# Patient Record
Sex: FEMALE | Race: WHITE | NOT HISPANIC OR LATINO | Employment: FULL TIME | URBAN - METROPOLITAN AREA
[De-identification: names, ages, dates, MRNs, and addresses within clinical notes are randomized per-mention and may not be internally consistent; named-entity substitution may affect disease eponyms.]

---

## 2017-06-05 ENCOUNTER — HOSPITAL ENCOUNTER (EMERGENCY)
Facility: HOSPITAL | Age: 37
Discharge: LEFT BEFORE DISCHARGE | End: 2017-06-05
Payer: MEDICAID

## 2017-06-05 VITALS
TEMPERATURE: 99 F | OXYGEN SATURATION: 100 % | DIASTOLIC BLOOD PRESSURE: 100 MMHG | HEIGHT: 67 IN | HEART RATE: 68 BPM | SYSTOLIC BLOOD PRESSURE: 195 MMHG | RESPIRATION RATE: 18 BRPM | BODY MASS INDEX: 33.59 KG/M2 | WEIGHT: 214 LBS

## 2017-06-05 DIAGNOSIS — I10 HYPERTENSION: ICD-10-CM

## 2017-06-05 LAB
B-HCG UR QL: NEGATIVE
CTP QC/QA: YES

## 2017-06-05 PROCEDURE — 99900041 HC LEFT WITHOUT BEING SEEN- EMERGENCY

## 2017-06-05 PROCEDURE — 81025 URINE PREGNANCY TEST: CPT | Performed by: EMERGENCY MEDICINE

## 2017-06-13 ENCOUNTER — OFFICE VISIT (OUTPATIENT)
Dept: FAMILY MEDICINE | Facility: CLINIC | Age: 37
End: 2017-06-13
Payer: MEDICAID

## 2017-06-13 ENCOUNTER — TELEPHONE (OUTPATIENT)
Dept: FAMILY MEDICINE | Facility: CLINIC | Age: 37
End: 2017-06-13

## 2017-06-13 DIAGNOSIS — I10 HTN, GOAL BELOW 140/90: Primary | ICD-10-CM

## 2017-06-13 DIAGNOSIS — E66.9 OBESITY, UNSPECIFIED OBESITY SEVERITY, UNSPECIFIED OBESITY TYPE: ICD-10-CM

## 2017-06-13 DIAGNOSIS — F41.9 ANXIETY: ICD-10-CM

## 2017-06-13 PROCEDURE — 99214 OFFICE O/P EST MOD 30 MIN: CPT | Mod: S$PBB,,, | Performed by: FAMILY MEDICINE

## 2017-06-13 PROCEDURE — 99213 OFFICE O/P EST LOW 20 MIN: CPT | Mod: PBBFAC,PO | Performed by: FAMILY MEDICINE

## 2017-06-13 PROCEDURE — 99999 PR PBB SHADOW E&M-EST. PATIENT-LVL III: CPT | Mod: PBBFAC,,, | Performed by: FAMILY MEDICINE

## 2017-06-13 RX ORDER — HYDROCHLOROTHIAZIDE 12.5 MG/1
12.5 TABLET ORAL DAILY
Qty: 30 TABLET | Refills: 0 | Status: SHIPPED | OUTPATIENT
Start: 2017-06-13 | End: 2018-05-08

## 2017-06-13 RX ORDER — BUPROPION HYDROCHLORIDE 150 MG/1
TABLET ORAL
Refills: 2 | COMMUNITY
Start: 2017-05-17 | End: 2017-06-13 | Stop reason: SDUPTHER

## 2017-06-13 RX ORDER — BUPROPION HYDROCHLORIDE 150 MG/1
TABLET ORAL
Qty: 90 TABLET | Refills: 3 | Status: SHIPPED | OUTPATIENT
Start: 2017-06-13 | End: 2018-05-08

## 2017-06-13 NOTE — PROGRESS NOTES
"Chief Complaint   Patient presents with    Follow Up/ Blood Pressure    Headache       SUBJECTIVE:  Irene Chau is a 37 y.o. female here for new problem of HTN and headache and hasn't been taking the medication. No CP or SoB and she has more anxious lately.  Currently has co-morbidities including per problem list.      History reviewed. No pertinent past medical history.  Past Surgical History:   Procedure Laterality Date    TONSILLECTOMY      TUBAL LIGATION       Social History     Social History    Marital status: Single     Spouse name: N/A    Number of children: 3    Years of education: N/A     Occupational History    Not on file.     Social History Main Topics    Smoking status: Current Every Day Smoker    Smokeless tobacco: Never Used    Alcohol use Yes      Comment: drinks at work, some binge drinking    Drug use: No    Sexual activity: Yes     Partners: Male     Other Topics Concern    Not on file     Social History Narrative    Works at [employer], she does drink and smoke because of that.        Would love to travel to new places.      Family History   Problem Relation Age of Onset    Heart disease Father     Asthma Son     No Known Problems Son     Breast cancer Mother     Colon cancer Neg Hx     Ovarian cancer Neg Hx      No current outpatient prescriptions on file prior to visit.     No current facility-administered medications on file prior to visit.      Review of patient's allergies indicates:  No Known Allergies      Review of Systems   Cardiovascular: Positive for chest pain and leg swelling. Negative for palpitations, orthopnea, claudication and PND.   Neurological: Positive for headaches.   Psychiatric/Behavioral: Negative for depression.       OBJECTIVE:  /82   Pulse 70   Temp 97.8 °F (36.6 °C) (Oral)   Ht 5' 7" (1.702 m)   Wt 95.6 kg (210 lb 12.2 oz)   LMP 06/05/2017   SpO2 99%   BMI 33.01 kg/m²     Wt Readings from Last 3 Encounters:   06/13/17 95.6 kg (210 lb " 12.2 oz)   06/05/17 97.1 kg (214 lb)   11/11/16 94.8 kg (209 lb)     BP Readings from Last 3 Encounters:   06/13/17 136/82   06/05/17 (!) 195/100   11/11/16 (!) 142/83       She appears well, in no apparent distress.  Alert and oriented times three, pleasant and cooperative. Vital signs are as documented in vital signs section.  S1 and S2 normal, no murmurs, clicks, gallops or rubs. Regular rate and rhythm. Chest is clear; no wheezes or rales. No edema or JVD.      Review of old Records:  Reviewed per [facility]    Review of old labs:  Lab Results   Component Value Date    TSH 1.858 03/17/2015     Lab Results   Component Value Date    WBC 6.81 03/17/2015    HGB 13.5 03/17/2015    HCT 40.6 03/17/2015    MCV 88 03/17/2015     (L) 03/17/2015       Chemistry        Component Value Date/Time     03/17/2015 0955    K 4.4 03/17/2015 0955     03/17/2015 0955    CO2 25 03/17/2015 0955    BUN 14 03/17/2015 0955    CREATININE 0.9 03/17/2015 0955    GLU 97 03/17/2015 0955        Component Value Date/Time    CALCIUM 9.3 03/17/2015 0955    ALKPHOS 67 03/17/2015 0955    AST 18 03/17/2015 0955    ALT 21 03/17/2015 0955    BILITOT 0.7 03/17/2015 0955        Lab Results   Component Value Date    CHOL 205 (H) 03/17/2015     Lab Results   Component Value Date    HDL 47 03/17/2015     Lab Results   Component Value Date    LDLCALC 106.0 03/17/2015     Lab Results   Component Value Date    TRIG 260 (H) 03/17/2015     Lab Results   Component Value Date    CHOLHDL 22.9 03/17/2015         Review of old imaging:  n/a    ASSESSMENT:  Problem List Items Addressed This Visit     Obesity    HTN, goal below 140/90 - Primary    Relevant Medications    hydrochlorothiazide (HYDRODIURIL) 12.5 MG Tab    Anxiety    Relevant Medications    buPROPion (WELLBUTRIN XL) 150 MG TB24 tablet      Other Visit Diagnoses    None.         ICD-10-CM ICD-9-CM   1. HTN, goal below 140/90 I10 401.9   2. Obesity, unspecified obesity severity, unspecified  obesity type E66.9 278.00   3. Anxiety F41.9 300.00         PLAN:  1. HTN, goal below 140/90  Potential medication side effects were discussed with the patient; let me know if any occur.    - hydrochlorothiazide (HYDRODIURIL) 12.5 MG Tab; Take 1 tablet (12.5 mg total) by mouth once daily.  Dispense: 30 tablet; Refill: 0    2. Obesity, unspecified obesity severity, unspecified obesity type  The patient is asked to make an attempt to improve diet and exercise patterns to aid in medical management of this problem.      3. Anxiety  The current medical regimen is effective;  continue present plan and medications.    - buPROPion (WELLBUTRIN XL) 150 MG TB24 tablet; TAKE 3 TABLET(S) BY MOUTH EVERY 24 HOURS  Dispense: 90 tablet; Refill: 3      Medication List with Changes/Refills   New Medications    HYDROCHLOROTHIAZIDE (HYDRODIURIL) 12.5 MG TAB    Take 1 tablet (12.5 mg total) by mouth once daily.   Changed and/or Refilled Medications    Modified Medication Previous Medication    BUPROPION (WELLBUTRIN XL) 150 MG TB24 TABLET buPROPion (WELLBUTRIN XL) 150 MG TB24 tablet       TAKE 3 TABLET(S) BY MOUTH EVERY 24 HOURS    TAKE 1 TABLET(S) BY MOUTH EVERY 24 HOURS       Return in about 4 weeks (around 7/11/2017) for assess treatment plan, HTN managment.

## 2017-06-14 VITALS
HEIGHT: 67 IN | TEMPERATURE: 98 F | WEIGHT: 210.75 LBS | SYSTOLIC BLOOD PRESSURE: 136 MMHG | DIASTOLIC BLOOD PRESSURE: 82 MMHG | OXYGEN SATURATION: 99 % | BODY MASS INDEX: 33.08 KG/M2 | HEART RATE: 70 BPM

## 2017-06-16 ENCOUNTER — TELEPHONE (OUTPATIENT)
Dept: FAMILY MEDICINE | Facility: CLINIC | Age: 37
End: 2017-06-16

## 2017-06-16 NOTE — TELEPHONE ENCOUNTER
Spoke with Mina, patient insurance will only paid for one pill of 150mg a day, patient notify, but patient stated she will take 3 pills a day (450mg)  Please advice.

## 2017-06-28 ENCOUNTER — TELEPHONE (OUTPATIENT)
Dept: FAMILY MEDICINE | Facility: CLINIC | Age: 37
End: 2017-06-28

## 2017-06-28 NOTE — TELEPHONE ENCOUNTER
----- Message from Marian Cabrera sent at 6/28/2017  8:50 AM CDT -----  Contact: Self   Patient says her mediation is causing a UTI or yeast infection. Please call to advise at [phone]

## 2017-06-28 NOTE — TELEPHONE ENCOUNTER
Patient said she stop taking (HTCZ) because it give her a UTI or a YEAST INFECTION, she's having pain when urinate with burning. Please Advice. Thanks

## 2017-06-29 NOTE — TELEPHONE ENCOUNTER
Let me know if she still has this issue and ask her if she at all thinks it has anything to do with her vaginal tract! Or does she think just her urinary tract.

## 2017-06-30 ENCOUNTER — TELEPHONE (OUTPATIENT)
Dept: FAMILY MEDICINE | Facility: CLINIC | Age: 37
End: 2017-06-30

## 2017-06-30 NOTE — TELEPHONE ENCOUNTER
----- Message from Kyra Carpio sent at 6/30/2017  9:38 AM CDT -----  Contact: pt  Please call, pt spoke to someone on Monday, no one called back... Pressure is high and still having palpitations...

## 2018-02-22 ENCOUNTER — TELEPHONE (OUTPATIENT)
Dept: FAMILY MEDICINE | Facility: CLINIC | Age: 38
End: 2018-02-22

## 2018-02-22 NOTE — TELEPHONE ENCOUNTER
----- Message from Maryan Gross sent at 2/21/2018 12:53 PM CST -----  Contact: [phone]  Pt needs a appointment for her annual visit I wasn't able to schedule it Please call pt at your earliest convenience.  Thanks !

## 2018-02-22 NOTE — TELEPHONE ENCOUNTER
Patient contacted regarded message about scheduling an appointment for her annual; message left to schedule first available appointment.

## 2018-02-23 ENCOUNTER — TELEPHONE (OUTPATIENT)
Dept: FAMILY MEDICINE | Facility: CLINIC | Age: 38
End: 2018-02-23

## 2018-02-23 NOTE — TELEPHONE ENCOUNTER
----- Message from Ranjana Soto sent at 2/22/2018 12:58 PM CST -----  Contact: Irene [phone]  Pt is returning a call back. Please call at your earliest convenience.

## 2018-03-14 ENCOUNTER — TELEPHONE (OUTPATIENT)
Dept: FAMILY MEDICINE | Facility: CLINIC | Age: 38
End: 2018-03-14

## 2018-05-08 ENCOUNTER — LAB VISIT (OUTPATIENT)
Dept: LAB | Facility: HOSPITAL | Age: 38
End: 2018-05-08
Attending: FAMILY MEDICINE
Payer: MEDICAID

## 2018-05-08 ENCOUNTER — OFFICE VISIT (OUTPATIENT)
Dept: FAMILY MEDICINE | Facility: CLINIC | Age: 38
End: 2018-05-08
Payer: MEDICAID

## 2018-05-08 VITALS
BODY MASS INDEX: 34.43 KG/M2 | SYSTOLIC BLOOD PRESSURE: 180 MMHG | HEART RATE: 65 BPM | TEMPERATURE: 98 F | WEIGHT: 219.38 LBS | OXYGEN SATURATION: 98 % | DIASTOLIC BLOOD PRESSURE: 116 MMHG | HEIGHT: 67 IN

## 2018-05-08 DIAGNOSIS — I10 UNCONTROLLED HYPERTENSION: ICD-10-CM

## 2018-05-08 DIAGNOSIS — I10 UNCONTROLLED HYPERTENSION: Primary | ICD-10-CM

## 2018-05-08 PROCEDURE — 36415 COLL VENOUS BLD VENIPUNCTURE: CPT | Mod: PO

## 2018-05-08 PROCEDURE — 99214 OFFICE O/P EST MOD 30 MIN: CPT | Mod: S$PBB,,, | Performed by: FAMILY MEDICINE

## 2018-05-08 PROCEDURE — 99999 PR PBB SHADOW E&M-EST. PATIENT-LVL III: CPT | Mod: PBBFAC,,, | Performed by: FAMILY MEDICINE

## 2018-05-08 PROCEDURE — 84443 ASSAY THYROID STIM HORMONE: CPT

## 2018-05-08 PROCEDURE — 84244 ASSAY OF RENIN: CPT

## 2018-05-08 PROCEDURE — 82088 ASSAY OF ALDOSTERONE: CPT

## 2018-05-08 PROCEDURE — 82530 CORTISOL FREE: CPT

## 2018-05-08 PROCEDURE — 82384 ASSAY THREE CATECHOLAMINES: CPT

## 2018-05-08 PROCEDURE — 85025 COMPLETE CBC W/AUTO DIFF WBC: CPT

## 2018-05-08 PROCEDURE — 80053 COMPREHEN METABOLIC PANEL: CPT

## 2018-05-08 PROCEDURE — 99213 OFFICE O/P EST LOW 20 MIN: CPT | Mod: PBBFAC,PO | Performed by: FAMILY MEDICINE

## 2018-05-08 PROCEDURE — 80061 LIPID PANEL: CPT

## 2018-05-08 RX ORDER — AMLODIPINE BESYLATE 10 MG/1
10 TABLET ORAL DAILY
Qty: 30 TABLET | Refills: 5 | Status: SHIPPED | OUTPATIENT
Start: 2018-05-08 | End: 2018-06-05

## 2018-05-08 NOTE — PROGRESS NOTES
".Subjective:       Patient ID: Irene Chau is a 37 y.o. female.    Chief Complaint: Hypertension (bp has been high for 3 years. Diet and excercise not working)    Hypertension   This is a new problem. The problem is uncontrolled. Associated symptoms include headaches. Pertinent negatives include no anxiety, chest pain, malaise/fatigue, palpitations, peripheral edema or shortness of breath. There are no known risk factors for coronary artery disease. Past treatments include nothing. The current treatment provides no improvement.     Review of Systems   Constitutional: Negative for malaise/fatigue.   Eyes: Positive for visual disturbance.   Respiratory: Negative for chest tightness, shortness of breath and wheezing.    Cardiovascular: Positive for leg swelling. Negative for chest pain and palpitations.   Neurological: Positive for light-headedness and headaches. Negative for dizziness and syncope.   Psychiatric/Behavioral: The patient is nervous/anxious.        Objective:       Vitals:    05/08/18 0802 05/08/18 0824   BP: (!) 166/120 (!) 180/116   Pulse: 65    Temp: 97.9 °F (36.6 °C)    TempSrc: Oral    SpO2: 98%    Weight: 99.5 kg (219 lb 5.7 oz)    Height: 5' 7" (1.702 m)        Physical Exam   Constitutional: She is oriented to person, place, and time. She appears well-developed and well-nourished. No distress.   HENT:   Head: Normocephalic and atraumatic.   Eyes: Conjunctivae are normal.   Neck: Normal range of motion. Neck supple. Carotid bruit is not present.   Cardiovascular: Normal rate, regular rhythm and normal heart sounds.  Exam reveals no gallop and no friction rub.    No murmur heard.  Pulmonary/Chest: Effort normal and breath sounds normal. No respiratory distress. She has no wheezes. She has no rales.   Musculoskeletal: She exhibits no edema.   Neurological: She is alert and oriented to person, place, and time.   Skin: She is not diaphoretic.       Assessment:       1. Uncontrolled hypertension      "   Plan:       Irene was seen today for hypertension.    Diagnoses and all orders for this visit:    Uncontrolled hypertension  -     US Doppler Renal Artery and Vein (xpd); Future  -     Aldosterone; Future  -     Cortisol, free, serum; Future  -     Renin; Future  -     Catecholamines, fractionated, plasma; Future  -     Comprehensive metabolic panel; Future  -     TSH; Future  -     CBC auto differential; Future  -     Lipid panel; Future  -     amLODIPine (NORVASC) 10 MG tablet; Take 1 tablet (10 mg total) by mouth once daily.    start amlodipine 10 mg nasrin. Ordering labs to look for secondary causes and renal ultrasound.

## 2018-05-08 NOTE — LETTER
May 8, 2018    Irene Chau  [address] [place] [address]      [facility]  [address]  [address]  [place] [address]  Phone: [phone]  Fax: [phone] Irene Chau    Was treated here on 05/08/2018.    May Return to work/school on 05/10/2018.    No Restrictions        Sincerely,    Sameera Felder MD

## 2018-05-09 LAB
ALBUMIN SERPL BCP-MCNC: 3.8 G/DL
ALP SERPL-CCNC: 86 U/L
ALT SERPL W/O P-5'-P-CCNC: 15 U/L
ANION GAP SERPL CALC-SCNC: 6 MMOL/L
AST SERPL-CCNC: 20 U/L
BASOPHILS # BLD AUTO: 0.02 K/UL
BASOPHILS NFR BLD: 0.3 %
BILIRUB SERPL-MCNC: 0.5 MG/DL
BUN SERPL-MCNC: 14 MG/DL
CALCIUM SERPL-MCNC: 9.1 MG/DL
CHLORIDE SERPL-SCNC: 104 MMOL/L
CHOLEST SERPL-MCNC: 182 MG/DL
CHOLEST/HDLC SERPL: 4 {RATIO}
CO2 SERPL-SCNC: 28 MMOL/L
CREAT SERPL-MCNC: 0.9 MG/DL
DIFFERENTIAL METHOD: ABNORMAL
EOSINOPHIL # BLD AUTO: 0.1 K/UL
EOSINOPHIL NFR BLD: 0.7 %
ERYTHROCYTE [DISTWIDTH] IN BLOOD BY AUTOMATED COUNT: 14.2 %
EST. GFR  (AFRICAN AMERICAN): >60 ML/MIN/1.73 M^2
EST. GFR  (NON AFRICAN AMERICAN): >60 ML/MIN/1.73 M^2
GLUCOSE SERPL-MCNC: 94 MG/DL
HCT VFR BLD AUTO: 36.6 %
HDLC SERPL-MCNC: 46 MG/DL
HDLC SERPL: 25.3 %
HGB BLD-MCNC: 11.8 G/DL
LDLC SERPL CALC-MCNC: 106.2 MG/DL
LYMPHOCYTES # BLD AUTO: 1.9 K/UL
LYMPHOCYTES NFR BLD: 26.7 %
MCH RBC QN AUTO: 24.3 PG
MCHC RBC AUTO-ENTMCNC: 32.2 G/DL
MCV RBC AUTO: 76 FL
MONOCYTES # BLD AUTO: 0.4 K/UL
MONOCYTES NFR BLD: 5.1 %
NEUTROPHILS # BLD AUTO: 4.8 K/UL
NEUTROPHILS NFR BLD: 67.1 %
NONHDLC SERPL-MCNC: 136 MG/DL
PLATELET # BLD AUTO: 189 K/UL
PMV BLD AUTO: 11.3 FL
POTASSIUM SERPL-SCNC: 4.2 MMOL/L
PROT SERPL-MCNC: 6.9 G/DL
RBC # BLD AUTO: 4.85 M/UL
SODIUM SERPL-SCNC: 138 MMOL/L
TRIGL SERPL-MCNC: 149 MG/DL
TSH SERPL DL<=0.005 MIU/L-ACNC: 1.55 UIU/ML
WBC # BLD AUTO: 7.2 K/UL

## 2018-05-10 LAB — ALDOST SERPL-MCNC: 22.6 NG/DL

## 2018-05-11 LAB — RENIN PLAS-CCNC: 3.8 NG/ML/H

## 2018-05-12 ENCOUNTER — PATIENT MESSAGE (OUTPATIENT)
Dept: FAMILY MEDICINE | Facility: CLINIC | Age: 38
End: 2018-05-12

## 2018-05-14 LAB
CATECHOLS PLAS-MCNC: 371 PG/ML
DOPAMINE SERPL-MCNC: <10 PG/ML
EPINEPH PLAS-MCNC: <20 PG/ML
NOREPINEPH PLAS-MCNC: 371 PG/ML

## 2018-05-14 RX ORDER — HYDROCHLOROTHIAZIDE 12.5 MG/1
12.5 TABLET ORAL DAILY
Qty: 30 TABLET | Refills: 11 | Status: SHIPPED | OUTPATIENT
Start: 2018-05-14 | End: 2018-07-02

## 2018-05-15 LAB — CORTIS F SERPL-MCNC: 0.2 MCG/DL

## 2018-05-17 ENCOUNTER — PATIENT MESSAGE (OUTPATIENT)
Dept: FAMILY MEDICINE | Facility: CLINIC | Age: 38
End: 2018-05-17

## 2018-05-18 ENCOUNTER — PATIENT MESSAGE (OUTPATIENT)
Dept: FAMILY MEDICINE | Facility: CLINIC | Age: 38
End: 2018-05-18

## 2018-05-26 ENCOUNTER — PATIENT MESSAGE (OUTPATIENT)
Dept: FAMILY MEDICINE | Facility: CLINIC | Age: 38
End: 2018-05-26

## 2018-05-30 ENCOUNTER — HOSPITAL ENCOUNTER (OUTPATIENT)
Dept: RADIOLOGY | Facility: HOSPITAL | Age: 38
Discharge: HOME | End: 2018-05-30
Attending: FAMILY MEDICINE
Payer: MEDICAID

## 2018-05-30 DIAGNOSIS — I10 UNCONTROLLED HYPERTENSION: ICD-10-CM

## 2018-05-30 PROBLEM — N28.1 SIMPLE RENAL CYST: Status: ACTIVE | Noted: 2018-05-30

## 2018-05-30 PROCEDURE — 76770 US EXAM ABDO BACK WALL COMP: CPT | Mod: 26,XS,, | Performed by: RADIOLOGY

## 2018-05-30 PROCEDURE — 93975 VASCULAR STUDY: CPT | Mod: 26,,, | Performed by: RADIOLOGY

## 2018-05-30 PROCEDURE — 93975 VASCULAR STUDY: CPT | Mod: TC

## 2018-06-04 ENCOUNTER — PATIENT MESSAGE (OUTPATIENT)
Dept: FAMILY MEDICINE | Facility: CLINIC | Age: 38
End: 2018-06-04

## 2018-06-05 RX ORDER — LISINOPRIL 40 MG/1
40 TABLET ORAL DAILY
Qty: 90 TABLET | Refills: 3 | Status: SHIPPED | OUTPATIENT
Start: 2018-06-05 | End: 2019-06-05

## 2018-07-02 ENCOUNTER — OFFICE VISIT (OUTPATIENT)
Dept: FAMILY MEDICINE | Facility: CLINIC | Age: 38
End: 2018-07-02
Payer: MEDICAID

## 2018-07-02 VITALS
RESPIRATION RATE: 12 BRPM | HEART RATE: 68 BPM | BODY MASS INDEX: 35.05 KG/M2 | TEMPERATURE: 99 F | OXYGEN SATURATION: 98 % | HEIGHT: 67 IN | DIASTOLIC BLOOD PRESSURE: 80 MMHG | SYSTOLIC BLOOD PRESSURE: 126 MMHG | WEIGHT: 223.31 LBS

## 2018-07-02 DIAGNOSIS — I10 ESSENTIAL HYPERTENSION: Primary | ICD-10-CM

## 2018-07-02 PROCEDURE — 99213 OFFICE O/P EST LOW 20 MIN: CPT | Mod: S$PBB,,, | Performed by: FAMILY MEDICINE

## 2018-07-02 PROCEDURE — 99999 PR PBB SHADOW E&M-EST. PATIENT-LVL III: CPT | Mod: PBBFAC,,, | Performed by: FAMILY MEDICINE

## 2018-07-02 PROCEDURE — 99213 OFFICE O/P EST LOW 20 MIN: CPT | Mod: PBBFAC,PO | Performed by: FAMILY MEDICINE

## 2018-07-02 NOTE — MEDICAL/APP STUDENT
"Subjective:       Patient ID: Irene Chau is a 38 y.o. female.    Chief Complaint: Follow Up/ Hypertension    HPI   39 yo F with PMHx of HTN presents for medication refill. She is on Lisinopril 40 mg. She denies any edema, swelling, lightheadedness, headaches, or blurry vision. She is happy to continue the same dose.  She reports gaining weight, fatigue, and feeling "sluggish". Her recent TSH and cortisol levels were normal were normal. She reports gaining weight despite appropriate diet and exercise.her BMI is 34.    Review of Systems    Constitutional: Negative for fever. Positive for fatigue.  HENT: Negative for headaches, ear pain, rhinorrhea, sore throat.  Eyes: Negative for blurry vision.  Cardiovascular: Negative for chest pain or palpitations.  Respiratory: Negative for SOB and cough.  GI: Negative for N/V/D.  : Negative for increased frequency and dysuria.  Skin: Negative for swelling. Negative for rash.    Objective:       Vitals:    07/02/18 0933   BP: 126/80   Pulse: 68   Resp: 12   Temp: 98.7 °F (37.1 °C)   TempSrc: Oral   SpO2: 98%   Weight: 101.3 kg (223 lb 5.2 oz)   Height: 5' 7" (1.702 m)     Physical Exam    Constitutional: Well-developed and well-nourished. Not in distress.  HENT: Atraumatic, ears normal, no nasal discharge, non-erythematous throat.  Eyes: PERRLA. EOM normal.  Cardiovascular: Regular rate and rhythm. HS dual and normal.  Respiratory: Normal BS and effort.  Skin: No rash. No edema.  Assessment:       39 yo F with HTN.  Plan:       HTN  1. Continue Lisinopril 40 mg once daily.  Weight loss  1. Counseled patient on healthy eating and active lifestyle.  2. Recommended a food diary.  "

## 2018-07-02 NOTE — PROGRESS NOTES
"Subjective:       Patient ID: Irene Chau is a 38 y.o. female.    Chief Complaint: Follow Up/ Hypertension    Hypertension   This is a new problem. The current episode started more than 1 year ago. The problem is controlled. Pertinent negatives include no chest pain, headaches, orthopnea, palpitations, peripheral edema, shortness of breath or sweats. Past treatments include ACE inhibitors. The current treatment provides significant improvement. There are no compliance problems.  There is no history of angina.     Review of Systems   Respiratory: Negative for shortness of breath.    Cardiovascular: Negative for chest pain, palpitations and orthopnea.   Neurological: Negative for headaches.       Objective:       Vitals:    07/02/18 0933   BP: 126/80   Pulse: 68   Resp: 12   Temp: 98.7 °F (37.1 °C)   TempSrc: Oral   SpO2: 98%   Weight: 101.3 kg (223 lb 5.2 oz)   Height: 5' 7" (1.702 m)       Physical Exam   Constitutional: She is oriented to person, place, and time. She appears well-developed and well-nourished. No distress.   HENT:   Head: Normocephalic and atraumatic.   Eyes: Conjunctivae are normal.   Neck: Normal range of motion. Neck supple. Carotid bruit is not present.   Cardiovascular: Normal rate, regular rhythm and normal heart sounds.  Exam reveals no gallop and no friction rub.    No murmur heard.  Pulmonary/Chest: Effort normal and breath sounds normal. No respiratory distress. She has no wheezes. She has no rales.   Musculoskeletal: She exhibits no edema.   Neurological: She is alert and oriented to person, place, and time.   Skin: She is not diaphoretic.       Assessment:       1. Essential hypertension        Plan:       Irene was seen today for follow up/ hypertension.    Diagnoses and all orders for this visit:    Essential hypertension    continue lisinopril 40 mg daily.        "

## 2018-09-21 ENCOUNTER — TELEPHONE (OUTPATIENT)
Dept: FAMILY MEDICINE | Facility: CLINIC | Age: 38
End: 2018-09-21

## 2018-09-21 NOTE — TELEPHONE ENCOUNTER
----- Message from Nuris Stoddard sent at 9/20/2018  3:55 PM CDT -----  Contact: pt            Name of Who is Calling: KISHOR HUTSON [[id number]]    What is the request in detail: pt calling in regards to having issues with kidneys.. Pt states she is experiencing pains in right and left side.. Pt would like to be seen by . Please advise      Can the clinic reply by [facility]: no      What Number to Call Back if not in [facility]: [phone]

## 2018-10-08 ENCOUNTER — TELEPHONE (OUTPATIENT)
Dept: FAMILY MEDICINE | Facility: CLINIC | Age: 38
End: 2018-10-08

## 2018-10-08 NOTE — TELEPHONE ENCOUNTER
Spoke to patient she states she is having kidney pain. Offered her an appointment tomorrow patient states that is not good for her. Next available is 10/30/18 patient wanted something sooner. [facility] She stated she would have to find another doctor that can see her. I gave her about [facility].

## 2018-10-08 NOTE — TELEPHONE ENCOUNTER
----- Message from Rose Perdomo sent at 10/8/2018  4:04 PM CDT -----  Contact: self - [phone]  Pt states she is out of her BP meds. She is asking for a refill and states she is at the pharmacy now.  She also states she has been having a lot of kidney pain.     Gaudencio Beckett on [facility].   Phone #   [phone]

## 2021-04-27 ENCOUNTER — HOSPITAL ENCOUNTER (EMERGENCY)
Facility: HOSPITAL | Age: 41
Discharge: HOME | End: 2021-04-28
Attending: EMERGENCY MEDICINE
Payer: MEDICAID

## 2021-04-27 DIAGNOSIS — T78.40XA ALLERGIC REACTION, INITIAL ENCOUNTER: Primary | ICD-10-CM

## 2021-04-27 DIAGNOSIS — N30.00 ACUTE CYSTITIS WITHOUT HEMATURIA: ICD-10-CM

## 2021-04-27 LAB
B-HCG UR QL: NEGATIVE
BACTERIA #/AREA URNS HPF: ABNORMAL /HPF
BILIRUB UR QL STRIP: NEGATIVE
CLARITY UR: CLEAR
COLOR UR: YELLOW
CTP QC/QA: YES
GLUCOSE UR QL STRIP: NEGATIVE
HGB UR QL STRIP: ABNORMAL
KETONES UR QL STRIP: NEGATIVE
LEUKOCYTE ESTERASE UR QL STRIP: ABNORMAL
MICROSCOPIC COMMENT: ABNORMAL
NITRITE UR QL STRIP: NEGATIVE
PH UR STRIP: 7 [PH] (ref 5–8)
PROT UR QL STRIP: ABNORMAL
RBC #/AREA URNS HPF: 11 /HPF (ref 0–4)
SP GR UR STRIP: 1.02 (ref 1–1.03)
URN SPEC COLLECT METH UR: ABNORMAL
UROBILINOGEN UR STRIP-ACNC: NEGATIVE EU/DL
WBC #/AREA URNS HPF: 54 /HPF (ref 0–5)

## 2021-04-27 PROCEDURE — 99284 EMERGENCY DEPT VISIT MOD MDM: CPT | Mod: 25

## 2021-04-27 PROCEDURE — 87088 URINE BACTERIA CULTURE: CPT | Performed by: EMERGENCY MEDICINE

## 2021-04-27 PROCEDURE — 87077 CULTURE AEROBIC IDENTIFY: CPT | Performed by: EMERGENCY MEDICINE

## 2021-04-27 PROCEDURE — 87186 SC STD MICRODIL/AGAR DIL: CPT | Performed by: EMERGENCY MEDICINE

## 2021-04-27 PROCEDURE — 81000 URINALYSIS NONAUTO W/SCOPE: CPT | Performed by: EMERGENCY MEDICINE

## 2021-04-27 PROCEDURE — 63600175 PHARM REV CODE 636 W HCPCS: Performed by: EMERGENCY MEDICINE

## 2021-04-27 PROCEDURE — 81025 URINE PREGNANCY TEST: CPT | Performed by: EMERGENCY MEDICINE

## 2021-04-27 PROCEDURE — 25000003 PHARM REV CODE 250: Performed by: EMERGENCY MEDICINE

## 2021-04-27 PROCEDURE — 87086 URINE CULTURE/COLONY COUNT: CPT | Performed by: EMERGENCY MEDICINE

## 2021-04-27 RX ORDER — HYDROXYZINE PAMOATE 50 MG/1
50 CAPSULE ORAL EVERY 6 HOURS PRN
Qty: 20 CAPSULE | Refills: 0 | Status: SHIPPED | OUTPATIENT
Start: 2021-04-27 | End: 2021-05-02

## 2021-04-27 RX ORDER — FAMOTIDINE 20 MG/1
40 TABLET, FILM COATED ORAL
Status: COMPLETED | OUTPATIENT
Start: 2021-04-27 | End: 2021-04-27

## 2021-04-27 RX ORDER — PREDNISONE 50 MG/1
50 TABLET ORAL DAILY
Qty: 5 TABLET | Refills: 0 | Status: SHIPPED | OUTPATIENT
Start: 2021-04-28 | End: 2021-05-03

## 2021-04-27 RX ORDER — FAMOTIDINE 20 MG/1
40 TABLET, FILM COATED ORAL 2 TIMES DAILY
Qty: 20 TABLET | Refills: 0 | Status: SHIPPED | OUTPATIENT
Start: 2021-04-28 | End: 2021-05-03

## 2021-04-27 RX ADMIN — FAMOTIDINE 40 MG: 20 TABLET, FILM COATED ORAL at 10:04

## 2021-04-27 RX ADMIN — PREDNISONE 50 MG: 20 TABLET ORAL at 10:04

## 2021-04-28 ENCOUNTER — PATIENT MESSAGE (OUTPATIENT)
Dept: RESEARCH | Facility: HOSPITAL | Age: 41
End: 2021-04-28

## 2021-04-28 VITALS
DIASTOLIC BLOOD PRESSURE: 76 MMHG | HEIGHT: 67 IN | OXYGEN SATURATION: 100 % | BODY MASS INDEX: 29.35 KG/M2 | WEIGHT: 187 LBS | RESPIRATION RATE: 17 BRPM | TEMPERATURE: 99 F | HEART RATE: 71 BPM | SYSTOLIC BLOOD PRESSURE: 156 MMHG

## 2021-04-28 RX ORDER — SULFAMETHOXAZOLE AND TRIMETHOPRIM 800; 160 MG/1; MG/1
1 TABLET ORAL 2 TIMES DAILY
Qty: 10 TABLET | Refills: 0 | Status: SHIPPED | OUTPATIENT
Start: 2021-04-28 | End: 2021-04-28 | Stop reason: SDUPTHER

## 2021-04-28 RX ORDER — SULFAMETHOXAZOLE AND TRIMETHOPRIM 800; 160 MG/1; MG/1
1 TABLET ORAL 2 TIMES DAILY
Qty: 10 TABLET | Refills: 0 | Status: SHIPPED | OUTPATIENT
Start: 2021-04-28 | End: 2021-05-03

## 2021-04-29 LAB — BACTERIA UR CULT: ABNORMAL

## 2021-09-20 NOTE — TELEPHONE ENCOUNTER
----- Message from Rose Perdomo sent at 3/14/2018 11:42 AM CDT -----  Contact: self   Pt is asking for an appt for an annual. She states her BP has been running very high and having dizzy spells. Yesterday the reading was 164/107 but has not checked it for today. She is asking for an appt as soon as possible. Pt only want to see Dr. Mercer and first available is not until May.Pt has [facility].  Please call pt at [phone].  
Home

## 2022-11-09 ENCOUNTER — OUTSIDE PLACE OF SERVICE (OUTPATIENT)
Dept: URGENT CARE | Facility: CLINIC | Age: 42
End: 2022-11-09
Payer: MEDICAID
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
## 2022-11-09 DIAGNOSIS — M54.2 NECK PAIN: ICD-10-CM

## 2022-11-09 PROCEDURE — 99213 OFFICE O/P EST LOW 20 MIN: CPT | Mod: 95,,, | Performed by: FAMILY MEDICINE

## 2022-11-09 PROCEDURE — 99213 PR OFFICE/OUTPT VISIT, EST, LEVL III, 20-29 MIN: ICD-10-PCS | Mod: 95,,, | Performed by: FAMILY MEDICINE
